# Patient Record
Sex: FEMALE | URBAN - METROPOLITAN AREA
[De-identification: names, ages, dates, MRNs, and addresses within clinical notes are randomized per-mention and may not be internally consistent; named-entity substitution may affect disease eponyms.]

---

## 2020-09-24 ENCOUNTER — OFFICE VISIT (OUTPATIENT)
Dept: URBAN - METROPOLITAN AREA CLINIC 12 | Facility: CLINIC | Age: 60
End: 2020-09-24

## 2020-10-27 ENCOUNTER — OFFICE VISIT (OUTPATIENT)
Dept: URBAN - METROPOLITAN AREA CLINIC 98 | Facility: CLINIC | Age: 60
End: 2020-10-27
Payer: COMMERCIAL

## 2020-10-27 DIAGNOSIS — R74.8 ABNORMAL LIVER ENZYMES: ICD-10-CM

## 2020-10-27 DIAGNOSIS — K27.9 PUD (PEPTIC ULCER DISEASE): ICD-10-CM

## 2020-10-27 DIAGNOSIS — R76.8 POSITIVE ANA (ANTINUCLEAR ANTIBODY): ICD-10-CM

## 2020-10-27 PROCEDURE — G8422 PT INELIG BMI CALCULATION: HCPCS | Performed by: INTERNAL MEDICINE

## 2020-10-27 PROCEDURE — G9903 PT SCRN TBCO ID AS NON USER: HCPCS | Performed by: INTERNAL MEDICINE

## 2020-10-27 PROCEDURE — 3017F COLORECTAL CA SCREEN DOC REV: CPT | Performed by: INTERNAL MEDICINE

## 2020-10-27 PROCEDURE — 99204 OFFICE O/P NEW MOD 45 MIN: CPT | Performed by: INTERNAL MEDICINE

## 2020-10-27 PROCEDURE — G8482 FLU IMMUNIZE ORDER/ADMIN: HCPCS | Performed by: INTERNAL MEDICINE

## 2020-10-27 PROCEDURE — G8427 DOCREV CUR MEDS BY ELIG CLIN: HCPCS | Performed by: INTERNAL MEDICINE

## 2020-10-27 RX ORDER — SUCRALFATE 1 G/1
1 TABLET ON AN EMPTY STOMACH TABLET ORAL TWICE A DAY
Status: ACTIVE | COMMUNITY

## 2020-10-27 RX ORDER — DEXTROMETHORPHAN POLISTIREX 30 MG/5 ML
AS DIRECTED SUSPENSION, EXTENDED RELEASE 12 HR ORAL
Status: ACTIVE | COMMUNITY

## 2020-10-27 RX ORDER — PANTOPRAZOLE SODIUM 40 MG/1
1 TABLET TABLET, DELAYED RELEASE ORAL BID
Status: ACTIVE | COMMUNITY

## 2020-10-27 RX ORDER — LORATADINE AND PSEUDOEPHEDRINE SULFATE 5; 120 MG/1; MG/1
1 TABLET AS NEEDED TABLET, EXTENDED RELEASE ORAL
Status: ACTIVE | COMMUNITY

## 2020-10-27 RX ORDER — FERROUS SULFATE 325(65) MG
AS DIRECTED TABLET ORAL
Status: ACTIVE | COMMUNITY

## 2020-10-27 NOTE — PHYSICAL EXAM CHEST:
----- Message from Christiana Culver sent at 3/21/2017 11:49 AM CDT -----  Contact: Self/[phone]  Patient is returning a phone call.  Who left a message for the patient: Gisel  Does patient know what this is regarding:  Test results  Comments:    no lesions,  no deformities, chest wall non-tender,  no masses present, breathing is unlabored without accessory muscle use,normal breath sounds

## 2020-10-27 NOTE — HPI-OTHER HISTORIES
Here on referral from Dr Migdalia Fitzpatrick for elevated liver testing  went to DR Fitzpatrick for EGd bc ulcer pain . EGD w 2 ulcers.   labs 9/2020 alt 87 ast 60alp 86tbili 0.2 ( no prev labs) - all summer not feeling well.   doing a bunch of "5hr energy" for years AMA pos 37 ; AZUL pos ; IGG nl ;  US wnl . sent to DR Salgado- doesn't  think she has RA  10/2020 alt 30 ast 22 alp 53 tbili 0.4  tsh nl . DIL is PA in [facility] . was on senna . etoh 1-3 glasses wine a week . has gotten flu vaccination

## 2020-11-12 ENCOUNTER — OFFICE VISIT (OUTPATIENT)
Dept: URBAN - METROPOLITAN AREA LAB 3 | Facility: LAB | Age: 60
End: 2020-11-12
Payer: COMMERCIAL

## 2020-11-12 DIAGNOSIS — K21.00 BILE REFLUX ESOPHAGITIS: ICD-10-CM

## 2020-11-12 DIAGNOSIS — K29.30 CHRONIC SUPERFICIAL GASTRITIS: ICD-10-CM

## 2020-11-12 DIAGNOSIS — K25.9 CAMERON ULCER: ICD-10-CM

## 2020-11-12 PROCEDURE — 43239 EGD BIOPSY SINGLE/MULTIPLE: CPT | Performed by: INTERNAL MEDICINE

## 2020-11-12 RX ORDER — LORATADINE AND PSEUDOEPHEDRINE SULFATE 5; 120 MG/1; MG/1
1 TABLET AS NEEDED TABLET, EXTENDED RELEASE ORAL
Status: ACTIVE | COMMUNITY

## 2020-11-12 RX ORDER — FERROUS SULFATE 325(65) MG
AS DIRECTED TABLET ORAL
Status: ACTIVE | COMMUNITY

## 2020-11-12 RX ORDER — DEXTROMETHORPHAN POLISTIREX 30 MG/5 ML
AS DIRECTED SUSPENSION, EXTENDED RELEASE 12 HR ORAL
Status: ACTIVE | COMMUNITY

## 2020-11-12 RX ORDER — PANTOPRAZOLE SODIUM 40 MG/1
1 TABLET TABLET, DELAYED RELEASE ORAL BID
Status: ACTIVE | COMMUNITY

## 2020-11-12 RX ORDER — SUCRALFATE 1 G/1
1 TABLET ON AN EMPTY STOMACH TABLET ORAL TWICE A DAY
Status: ACTIVE | COMMUNITY

## 2021-04-15 LAB
A/G RATIO: 2
ALBUMIN: 4.9
ALKALINE PHOSPHATASE: 54
ALT (SGPT): 21
AST (SGOT): 20
BASO (ABSOLUTE): 0
BASOS: 1
BILIRUBIN, TOTAL: 0.3
BUN/CREATININE RATIO: 21
BUN: 14
CALCIUM: 10.2
CARBON DIOXIDE, TOTAL: 25
CHLORIDE: 103
CREATININE: 0.67
EGFR IF AFRICN AM: 111
EGFR IF NONAFRICN AM: 97
EOS (ABSOLUTE): 0.1
EOS: 2
GGT: 17
GLOBULIN, TOTAL: 2.4
GLUCOSE: 98
HEMATOCRIT: 37.5
HEMATOLOGY COMMENTS:: (no result)
HEMOGLOBIN: 12.1
IMMATURE CELLS: (no result)
IMMATURE GRANS (ABS): 0
IMMATURE GRANULOCYTES: 0
LYMPHS (ABSOLUTE): 1.5
LYMPHS: 31
MCH: 28.7
MCHC: 32.3
MCV: 89
MONOCYTES(ABSOLUTE): 0.3
MONOCYTES: 7
NEUTROPHILS (ABSOLUTE): 2.9
NEUTROPHILS: 59
NRBC: (no result)
PLATELETS: 215
POTASSIUM: 5.1
PROTEIN, TOTAL: 7.3
RBC: 4.21
RDW: 13.8
SODIUM: 141
WBC: 4.8

## 2021-04-22 ENCOUNTER — DASHBOARD ENCOUNTERS (OUTPATIENT)
Age: 61
End: 2021-04-22

## 2021-04-22 ENCOUNTER — OFFICE VISIT (OUTPATIENT)
Dept: URBAN - METROPOLITAN AREA CLINIC 98 | Facility: CLINIC | Age: 61
End: 2021-04-22
Payer: COMMERCIAL

## 2021-04-22 VITALS
TEMPERATURE: 97.2 F | BODY MASS INDEX: 21.27 KG/M2 | HEART RATE: 76 BPM | WEIGHT: 124.6 LBS | DIASTOLIC BLOOD PRESSURE: 71 MMHG | HEIGHT: 64 IN | SYSTOLIC BLOOD PRESSURE: 106 MMHG

## 2021-04-22 DIAGNOSIS — R76.8 POSITIVE ANA (ANTINUCLEAR ANTIBODY): ICD-10-CM

## 2021-04-22 DIAGNOSIS — R74.8 ABNORMAL LIVER ENZYMES: ICD-10-CM

## 2021-04-22 PROBLEM — 13200003 PEPTIC ULCER DISEASE: Status: ACTIVE | Noted: 2020-10-27

## 2021-04-22 PROCEDURE — 99212 OFFICE O/P EST SF 10 MIN: CPT | Performed by: INTERNAL MEDICINE

## 2021-04-22 RX ORDER — PANTOPRAZOLE SODIUM 40 MG/1
1 TABLET TABLET, DELAYED RELEASE ORAL BID
Status: ACTIVE | COMMUNITY

## 2021-04-22 RX ORDER — DEXTROMETHORPHAN POLISTIREX 30 MG/5 ML
AS DIRECTED SUSPENSION, EXTENDED RELEASE 12 HR ORAL
Status: ACTIVE | COMMUNITY

## 2021-04-22 RX ORDER — LORATADINE AND PSEUDOEPHEDRINE SULFATE 5; 120 MG/1; MG/1
1 TABLET AS NEEDED TABLET, EXTENDED RELEASE ORAL
Status: ACTIVE | COMMUNITY

## 2021-04-22 RX ORDER — SUCRALFATE 1 G/1
1 TABLET ON AN EMPTY STOMACH TABLET ORAL TWICE A DAY
Status: ACTIVE | COMMUNITY

## 2021-04-22 RX ORDER — FERROUS SULFATE 325(65) MG
AS DIRECTED TABLET ORAL
Status: ACTIVE | COMMUNITY

## 2021-04-22 NOTE — HPI-TODAY'S VISIT:
taking 2 tylenol a night.  alcohol : 3 glasses a week.  feels well.   had 1/2 5h energy at most a week taking a pill Colace for constipation.

## 2021-04-27 ENCOUNTER — OFFICE VISIT (OUTPATIENT)
Dept: URBAN - METROPOLITAN AREA CLINIC 98 | Facility: CLINIC | Age: 61
End: 2021-04-27

## 2021-04-27 ENCOUNTER — LAB OUTSIDE AN ENCOUNTER (OUTPATIENT)
Dept: URBAN - METROPOLITAN AREA CLINIC 98 | Facility: CLINIC | Age: 61
End: 2021-04-27

## 2024-05-29 ENCOUNTER — OFFICE VISIT (OUTPATIENT)
Dept: URBAN - METROPOLITAN AREA CLINIC 78 | Facility: CLINIC | Age: 64
End: 2024-05-29
Payer: COMMERCIAL

## 2024-05-29 ENCOUNTER — LAB OUTSIDE AN ENCOUNTER (OUTPATIENT)
Dept: URBAN - METROPOLITAN AREA CLINIC 78 | Facility: CLINIC | Age: 64
End: 2024-05-29

## 2024-05-29 VITALS
TEMPERATURE: 97.7 F | SYSTOLIC BLOOD PRESSURE: 109 MMHG | HEIGHT: 64 IN | DIASTOLIC BLOOD PRESSURE: 75 MMHG | HEART RATE: 76 BPM

## 2024-05-29 DIAGNOSIS — K59.01 CONSTIPATION: ICD-10-CM

## 2024-05-29 DIAGNOSIS — Z87.11 HISTORY OF GASTRIC ULCER: ICD-10-CM

## 2024-05-29 DIAGNOSIS — Z87.19 HISTORY OF DIVERTICULOSIS: ICD-10-CM

## 2024-05-29 DIAGNOSIS — R14.0 BLOATING: ICD-10-CM

## 2024-05-29 DIAGNOSIS — Z86.010 PERSONAL HISTORY OF COLONIC POLYPS: ICD-10-CM

## 2024-05-29 DIAGNOSIS — R10.84 ABDOMINAL PAIN, GENERALIZED: ICD-10-CM

## 2024-05-29 PROBLEM — 428283002: Status: ACTIVE | Noted: 2024-05-29

## 2024-05-29 PROCEDURE — 99203 OFFICE O/P NEW LOW 30 MIN: CPT | Performed by: INTERNAL MEDICINE

## 2024-05-29 RX ORDER — PANTOPRAZOLE SODIUM 40 MG/1
1 TABLET TABLET, DELAYED RELEASE ORAL BID
Status: ACTIVE | COMMUNITY

## 2024-05-29 RX ORDER — DEXTROMETHORPHAN POLISTIREX 30 MG/5 ML
AS DIRECTED SUSPENSION, EXTENDED RELEASE 12 HR ORAL
Status: ACTIVE | COMMUNITY

## 2024-05-29 RX ORDER — SUCRALFATE 1 G/1
1 TABLET ON AN EMPTY STOMACH TABLET ORAL TWICE A DAY
Status: ACTIVE | COMMUNITY

## 2024-05-29 RX ORDER — LORATADINE AND PSEUDOEPHEDRINE SULFATE 5; 120 MG/1; MG/1
1 TABLET AS NEEDED TABLET, EXTENDED RELEASE ORAL
Status: ACTIVE | COMMUNITY

## 2024-05-29 RX ORDER — FERROUS SULFATE 325(65) MG
AS DIRECTED TABLET ORAL
Status: ACTIVE | COMMUNITY

## 2024-06-13 ENCOUNTER — OFFICE VISIT (OUTPATIENT)
Dept: URBAN - METROPOLITAN AREA SURGERY CENTER 15 | Facility: SURGERY CENTER | Age: 64
End: 2024-06-13

## 2024-07-29 ENCOUNTER — OFFICE VISIT (OUTPATIENT)
Dept: URBAN - METROPOLITAN AREA CLINIC 98 | Facility: CLINIC | Age: 64
End: 2024-07-29
Payer: COMMERCIAL

## 2024-07-29 ENCOUNTER — LAB OUTSIDE AN ENCOUNTER (OUTPATIENT)
Dept: URBAN - METROPOLITAN AREA CLINIC 98 | Facility: CLINIC | Age: 64
End: 2024-07-29

## 2024-07-29 VITALS
SYSTOLIC BLOOD PRESSURE: 99 MMHG | HEART RATE: 80 BPM | WEIGHT: 129 LBS | DIASTOLIC BLOOD PRESSURE: 69 MMHG | TEMPERATURE: 97.1 F | HEIGHT: 64 IN | BODY MASS INDEX: 22.02 KG/M2

## 2024-07-29 DIAGNOSIS — K59.09 CHRONIC CONSTIPATION: ICD-10-CM

## 2024-07-29 DIAGNOSIS — Z83.719 FAMILY HISTORY OF COLONIC POLYPS: ICD-10-CM

## 2024-07-29 PROCEDURE — 99213 OFFICE O/P EST LOW 20 MIN: CPT | Performed by: INTERNAL MEDICINE

## 2024-07-29 RX ORDER — FERROUS SULFATE 325(65) MG
AS DIRECTED TABLET ORAL
Status: ON HOLD | COMMUNITY

## 2024-07-29 RX ORDER — SUCRALFATE 1 G/1
1 TABLET ON AN EMPTY STOMACH TABLET ORAL TWICE A DAY
Status: ON HOLD | COMMUNITY

## 2024-07-29 RX ORDER — LORATADINE AND PSEUDOEPHEDRINE SULFATE 5; 120 MG/1; MG/1
1 TABLET AS NEEDED TABLET, EXTENDED RELEASE ORAL
Status: ACTIVE | COMMUNITY

## 2024-07-29 RX ORDER — DEXTROMETHORPHAN POLISTIREX 30 MG/5 ML
AS DIRECTED SUSPENSION, EXTENDED RELEASE 12 HR ORAL
Status: ON HOLD | COMMUNITY

## 2024-07-29 RX ORDER — PANTOPRAZOLE SODIUM 40 MG/1
1 TABLET TABLET, DELAYED RELEASE ORAL BID
Status: ON HOLD | COMMUNITY

## 2024-07-29 NOTE — HPI-TODAY'S VISIT:
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

## 2024-07-30 ENCOUNTER — WEB ENCOUNTER (OUTPATIENT)
Dept: URBAN - METROPOLITAN AREA CLINIC 98 | Facility: CLINIC | Age: 64
End: 2024-07-30

## 2024-08-07 ENCOUNTER — TELEPHONE ENCOUNTER (OUTPATIENT)
Dept: URBAN - METROPOLITAN AREA CLINIC 97 | Facility: CLINIC | Age: 64
End: 2024-08-07

## 2024-08-08 ENCOUNTER — OFFICE VISIT (OUTPATIENT)
Dept: URBAN - METROPOLITAN AREA LAB 3 | Facility: LAB | Age: 64
End: 2024-08-08
Payer: COMMERCIAL

## 2024-08-08 DIAGNOSIS — Z83.719 FAMILY HISTORY OF COLON POLYPS, UNSPECIFIED: ICD-10-CM

## 2024-08-08 DIAGNOSIS — Z12.11 COLON CANCER SCREENING: ICD-10-CM

## 2024-08-08 PROCEDURE — G9998 DOC MED RSN <3 COLON: HCPCS | Performed by: INTERNAL MEDICINE

## 2024-08-08 PROCEDURE — G0105 COLORECTAL SCRN; HI RISK IND: HCPCS | Performed by: INTERNAL MEDICINE

## 2024-08-08 RX ORDER — PANTOPRAZOLE SODIUM 40 MG/1
1 TABLET TABLET, DELAYED RELEASE ORAL BID
Status: ON HOLD | COMMUNITY

## 2024-08-08 RX ORDER — SUCRALFATE 1 G/1
1 TABLET ON AN EMPTY STOMACH TABLET ORAL TWICE A DAY
Status: ON HOLD | COMMUNITY

## 2024-08-08 RX ORDER — LORATADINE AND PSEUDOEPHEDRINE SULFATE 5; 120 MG/1; MG/1
1 TABLET AS NEEDED TABLET, EXTENDED RELEASE ORAL
Status: ACTIVE | COMMUNITY

## 2024-08-08 RX ORDER — DEXTROMETHORPHAN POLISTIREX 30 MG/5 ML
AS DIRECTED SUSPENSION, EXTENDED RELEASE 12 HR ORAL
Status: ON HOLD | COMMUNITY

## 2024-08-08 RX ORDER — FERROUS SULFATE 325(65) MG
AS DIRECTED TABLET ORAL
Status: ON HOLD | COMMUNITY

## 2024-08-09 ENCOUNTER — WEB ENCOUNTER (OUTPATIENT)
Dept: URBAN - METROPOLITAN AREA CLINIC 98 | Facility: CLINIC | Age: 64
End: 2024-08-09